# Patient Record
Sex: MALE
[De-identification: names, ages, dates, MRNs, and addresses within clinical notes are randomized per-mention and may not be internally consistent; named-entity substitution may affect disease eponyms.]

---

## 2018-04-09 ENCOUNTER — HOSPITAL ENCOUNTER (OUTPATIENT)
Dept: HOSPITAL 92 - BICMRI | Age: 43
Discharge: HOME | End: 2018-04-09
Attending: FAMILY MEDICINE
Payer: COMMERCIAL

## 2018-04-09 DIAGNOSIS — M99.83: ICD-10-CM

## 2018-04-09 DIAGNOSIS — M54.5: Primary | ICD-10-CM

## 2018-04-09 DIAGNOSIS — M51.87: ICD-10-CM

## 2018-04-09 PROCEDURE — 72148 MRI LUMBAR SPINE W/O DYE: CPT

## 2018-04-09 NOTE — MRI
MRI OF THE LUMBAR SPINE WITHOUT IV CONTRAST:

 

INDICATION: 

Back pain with referral down both legs.

 

COMPARISON: 

Lumbar spinal radiograph dated 2/28/18.

 

FINDINGS: 

The conus is seen to terminate at approximately L1.  

 

Visualized retroperitoneum and paravertebral soft tissues appear within normal limits.

 

At L5-S1, there is loss of the normal disk signal and height.  There is a broad-based bulge with face
t hypertrophy greater on the left inducing moderate to severe left neural foraminal narrowing.

 

At L4-5, there is a broad-based bulge with a superimposed central annular fissure.  There is also mil
d facet joint hypertrophy.  Constellation of findings induces mild bilateral neural foraminal narrowi
ng.

 

At L3-4, there is no appreciable central canal or neural foraminal narrowing.

 

At L2-3, there is no appreciable central canal or neural foraminal narrowing.

 

At L1-L2, there is no appreciable central canal or neural foraminal narrowing.

 

At T12-L1, there is no appreciable central canal or neural foraminal narrowing.

 

IMPRESSION: 

1.  Broad-based bulge with facet hypertrophy, greater on the left, induces moderate to severe left ne
ural foraminal narrowing at L5-S1.

 

2.  Mild bilateral neural foraminal narrowing at L4-5.

 

POS: [facility]

## 2023-04-27 ENCOUNTER — HOSPITAL ENCOUNTER (OUTPATIENT)
Dept: HOSPITAL 92 - CSHMRI | Age: 48
Discharge: HOME | End: 2023-04-27
Attending: NURSE PRACTITIONER
Payer: COMMERCIAL

## 2023-04-27 DIAGNOSIS — M48.02: ICD-10-CM

## 2023-04-27 DIAGNOSIS — M54.2: Primary | ICD-10-CM

## 2023-04-27 DIAGNOSIS — M54.12: ICD-10-CM

## 2023-04-27 DIAGNOSIS — M47.812: ICD-10-CM

## 2023-04-27 PROCEDURE — 72141 MRI NECK SPINE W/O DYE: CPT
